# Patient Record
(demographics unavailable — no encounter records)

---

## 2024-12-18 NOTE — HISTORY OF PRESENT ILLNESS
[FreeTextEntry1] : Gallbladder polyp, hepatic steatosis, hypertension, back pain [de-identified] : -PMH: HTN, HLD, H/o Lumpectomy, H/o cervical Radiculopathy -SH: . In a relationship who was Dx w/ Alzheimer Dementia. No children. Employed. Current smoker (Vapes). Daily EtOH use.   KENDALL is a 55 year F whom is here today for f/u Gallbladder polyp, hepatic steatosis, hypertension, back pain Today, patient reports feeling well  Since time of last visit, patient obtained recommended right upper quadrant abdominal ultrasound and MRI thoracic spine Right upper quadrant abdominal ultrasound demonstrated hepatic steatosis, multiple hepatic cysts with the largest 1 measuring 6 cm.  2 mm gallbladder polyp. MRI thoracic spine demonstrated a large central/right paracentral disc protrusion resulting in impingement of the right hemicord at T10-T11 along with moderate to severe spinal canal narrowing Today, patient denies any abdominal pain, right upper quadrant abdominal pain, indigestion Denies any upper or lower extremity motor weakness  -HTN: Remains on Amlodipine 7.5mg QD & HCTZ 12.5mg QD -HLD: Remains on Crestor 5mg QD

## 2025-06-09 NOTE — ADDENDUM
[FreeTextEntry1] : Small proteinuria noted on recent urine Plan for repeat urine at time of next visit  Total and LDL cholesterol elevated above goal Please ensure compliance with rosuvastatin 5 mg Dietary modifications, exercise and weight loss encouraged If compliant with medication I would suggest increasing to 10 mg

## 2025-06-09 NOTE — HISTORY OF PRESENT ILLNESS
[FreeTextEntry1] :  hypertension, hyperlipidemia, gallbladder polyp, elevated BMI [de-identified] : -PMH: HTN, HLD, H/o Lumpectomy, H/o cervical Radiculopathy, s/p Thoracic Laminectomy (2025) -SH: . In a relationship who was Dx w/ Alzheimer Dementia. No children. Employed. Current smoker (Vapes). Daily EtOH use.   KENDALL is a 55 year F whom is here today for Follow-up hypertension, hyperlipidemia, gallbladder polyp, elevated BMI Today, patient reports feeling well Patient has been noncompliant in scheduling recommended follow-up as she is overdue for an annual physical  -HTN: Remains on Amlodipine 7.5mg QD & HCTZ 12.5mg QD -HLD: Remains on Crestor 5mg QD

## 2025-06-09 NOTE — HISTORY OF PRESENT ILLNESS
[FreeTextEntry1] :  hypertension, hyperlipidemia, gallbladder polyp, elevated BMI [de-identified] : -PMH: HTN, HLD, H/o Lumpectomy, H/o cervical Radiculopathy, s/p Thoracic Laminectomy (2025) -SH: . In a relationship who was Dx w/ Alzheimer Dementia. No children. Employed. Current smoker (Vapes). Daily EtOH use.   KENDALL is a 55 year F whom is here today for Follow-up hypertension, hyperlipidemia, gallbladder polyp, elevated BMI Today, patient reports feeling well Patient has been noncompliant in scheduling recommended follow-up as she is overdue for an annual physical  -HTN: Remains on Amlodipine 7.5mg QD & HCTZ 12.5mg QD -HLD: Remains on Crestor 5mg QD

## 2025-07-17 NOTE — PHYSICAL EXAM
[de-identified] : GENERAL APPEARANCE: Well nourished and hydrated, pleasant, alert, and oriented x 3. Appears their stated age. HEENT: Normocephalic, extraocular eye motion intact. Nasal septum midline. Oral cavity clear. External auditory canal clear. RESPIRATORY: Breath sounds clear and audible in all lobes. No wheezing, No accessory muscle use. CARDIOVASCULAR: No apparent abnormalities. No lower leg edema. No varicosities. Pedal pulses are palpable. NEUROLOGIC: Sensation is normal, no muscle weakness in the upper or lower extremities. DERMATOLOGIC: No apparent skin lesions, moist, warm, no rash. SPINE: Cervical spine appears normal and moves freely; thoracic spine appears normal and moves freely; lumbosacral spine appears normal and moves freely, normal, nontender. MUSCULOSKELETAL: Hands, wrists, and elbows are normal and move freely, shoulders are normal and move freely. PSYCHIATRIC: Oriented to person, place, and time, insight and judgement were intact and the affect was normal. DTRs: Biceps, brachioradialis, triceps, patellar, ankle and plantar 2+ and symmetric bilaterally. Pulses: dorsalis pedis, posterior tibial, femoral, popliteal, and radial 2+ and symmetric bilaterally. Constitutional: Alert and in no acute distress, but well-appearing. [de-identified] : right knee exam shows mild effusion, ROM is 0-125 degrees, no instability, no pain with Zak, medial joint line tenderness.5/5 motor strength in bilateral lower extremities. Sensory: Intact in bilateral lower extremities.  [de-identified] : 4 views of the right knee obtained the office today show no acute fracture or dislocation.  There is mild medial joint space narrowing patellofemoral arthritis consistent Kellgren-Ben grade 2 changes

## 2025-07-17 NOTE — DISCUSSION/SUMMARY
[Medication Risks Reviewed] : Medication risks reviewed [Surgical risks reviewed] : Surgical risks reviewed [de-identified] : Patient is a 55-year-old female with mild right knee osteoarthritis presenting today for initial evaluation.  Nitroglycerin treatment is warranted this time.  Gave her injection of cortisone in the right knee which she tolerated well.  We discussed lumpectomy and excised.  I recommended physical therapy.  Is given prescription for Celebrex 200 mg twice daily.  I will see her back in 6 weeks for repeat evaluation possible viscosupplementation if she continues to have pain.  All questions were asked and answered

## 2025-07-17 NOTE — ADDENDUM
[FreeTextEntry1] : This note was written by Nesha Le, acting as the  for Dr. Miller on 07/17/2025. This note accurately reflects the work and decisions made by Dr. Miller.

## 2025-07-17 NOTE — HISTORY OF PRESENT ILLNESS
[de-identified] : This is a 55 year old F pt presents today for an initial evaluation with right knee pain. The pain has been there for 10 days without injury.  Another provider stated she has osteoarthritis years ago. However, on Monday, while going into the attic the patient stated she exacerbated her osteoarthritis. Pt is ambulating without use of any assistance device. She presents today with chief complaint of intermittent, moderate dull aching pain over the medial aspect of the knee. Pain is described as stabbing. Has swelling. Pt denies catching, locking or buckling. No radicular pain or paresthesia. No prior history of injection or surgical intervention noted. Is participating in PT for the back. No constitutional symptoms noted. Is taking Tylenol for the pain. Is on blood pressure medication

## 2025-07-17 NOTE — PROCEDURE
[de-identified] : I injected the patient's right knee today with cortisone for primary osteoarthritis.  I discussed at length with the patient the planned steroid and lidocaine injection. The risks, benefits, convalescence and alternatives were reviewed. The possible side effects discussed included but were not limited to: pain, swelling, heat, bleeding, and redness. Symptoms are generally mild but if they are extensive then contact the office. Giving pain relievers by mouth such as NSAIDs or Tylenol can generally treat the reactions to steroid and lidocaine. Rare cases of infection have been noted. Rash, hives and itching may occur post injection. If you have muscle pain or cramps, flushing and or swelling of the face, rapid heart beat, nausea, dizziness, fever, chills, headache, difficulty breathing, swelling in the arms or legs, or have a prickly feeling of your skin, contact a health care provider immediately. Following this discussion, the knee was prepped with Alcohol and under sterile condition the 80 mg Depo-Medrol and 6 cc Lidocaine injection was performed with a 20 gauge needle through a superolateral injection site. The needle was introduced into the joint, aspiration was performed to ensure intra-articular placement and the medication was injected. Upon withdrawal of the needle the site was cleaned with alcohol and a band aid applied. The patient tolerated the injection well and there were no adverse effects. Post injection instructions included no strenuous activity for 24 hours, cryotherapy and if there are any adverse effects to contact the office.